# Patient Record
Sex: MALE | Race: BLACK OR AFRICAN AMERICAN | Employment: OTHER
[De-identification: names, ages, dates, MRNs, and addresses within clinical notes are randomized per-mention and may not be internally consistent; named-entity substitution may affect disease eponyms.]

---

## 2024-11-20 ENCOUNTER — HOSPITAL ENCOUNTER (EMERGENCY)
Facility: HOSPITAL | Age: 64
Discharge: HOME | End: 2024-11-20
Payer: MEDICARE

## 2024-11-20 ENCOUNTER — APPOINTMENT (OUTPATIENT)
Facility: HOSPITAL | Age: 64
End: 2024-11-20
Payer: MEDICARE

## 2024-11-20 VITALS
SYSTOLIC BLOOD PRESSURE: 116 MMHG | OXYGEN SATURATION: 100 % | DIASTOLIC BLOOD PRESSURE: 70 MMHG | HEART RATE: 67 BPM | RESPIRATION RATE: 17 BRPM | BODY MASS INDEX: 27.2 KG/M2 | TEMPERATURE: 97.8 F | WEIGHT: 190 LBS | HEIGHT: 70 IN
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
## 2024-11-20 DIAGNOSIS — M79.604 RIGHT LEG PAIN: Primary | ICD-10-CM

## 2024-11-20 PROCEDURE — 73562 X-RAY EXAM OF KNEE 3: CPT

## 2024-11-20 PROCEDURE — 99283 EMERGENCY DEPT VISIT LOW MDM: CPT

## 2024-11-20 PROCEDURE — 73590 X-RAY EXAM OF LOWER LEG: CPT

## 2024-11-20 ASSESSMENT — PAIN SCALES - GENERAL: PAINLEVEL_OUTOF10: 2

## 2024-11-20 ASSESSMENT — LIFESTYLE VARIABLES
HOW MANY STANDARD DRINKS CONTAINING ALCOHOL DO YOU HAVE ON A TYPICAL DAY: PATIENT DOES NOT DRINK
HOW OFTEN DO YOU HAVE A DRINK CONTAINING ALCOHOL: NEVER

## 2024-11-20 ASSESSMENT — PAIN - FUNCTIONAL ASSESSMENT: PAIN_FUNCTIONAL_ASSESSMENT: 0-10

## 2024-11-20 ASSESSMENT — PAIN DESCRIPTION - DESCRIPTORS: DESCRIPTORS: PRESSURE

## 2024-11-20 ASSESSMENT — PAIN DESCRIPTION - ORIENTATION: ORIENTATION: RIGHT;LOWER

## 2024-11-20 ASSESSMENT — PAIN DESCRIPTION - LOCATION: LOCATION: LEG

## 2024-11-20 NOTE — DISCHARGE INSTRUCTIONS
Thank you for choosing our Emergency Department for your care.  It is our privilege to care for you in your time of need.  In the next several days, you may receive a survey via email or mailed to your home about your experience with our team.  We would greatly appreciate you taking a few minutes to complete the survey, as we use this information to learn what we have done well and what we could be doing better. Thank you for trusting us with your care!    Below you will find a list of your tests from today's visit.   Labs  No results found for this or any previous visit (from the past 12 hour(s)).    Radiologic Studies  XR TIBIA FIBULA RIGHT (2 VIEWS)   Final Result   No acute fracture. Osteoarthritis of the knee and nonspecific, nonaggressive   proximal tibial sclerosis         Electronically signed by Jj Adam      XR KNEE RIGHT (3 VIEWS)   Final Result   No acute fracture. Osteoarthritis of the knee and nonspecific, nonaggressive   proximal tibial sclerosis         Electronically signed by Jj Adam        ------------------------------------------------------------------------------------------------------------  The evaluation and treatment you received in the Emergency Department were for an urgent problem. It is important that you follow-up with a doctor, nurse practitioner, or physician assistant to:  (1) confirm your diagnosis,  (2) re-evaluation of changes in your illness and treatment, and (3) for ongoing care. Please take your discharge instructions with you when you go to your follow-up appointment.     If you have any problem arranging a follow-up appointment, contact us!  If your symptoms become worse or you do not improve as expected, please return to us. We are available 24 hours a day.     If a prescription has been provided, please fill it as soon as possible to prevent a delay in treatment. If you have any questions or reservations about taking the medication due to side effects or

## 2024-11-20 NOTE — ED PROVIDER NOTES
[facility] EMERGENCY DEPT  EMERGENCY DEPARTMENT HISTORY AND PHYSICAL EXAM      Date: 11/20/2024  Patient Name: Roger Hernandez Jr.  MRN: [id number]  YOB: 1960  Date of evaluation: 11/20/2024  Provider: Marcella Bertrand PA-C   Note Started: 1:26 PM EST 11/20/24    HISTORY OF PRESENT ILLNESS     Chief Complaint   Patient presents with    Leg Pain    Leg Injury       History Provided By: Patient    HPI: Roger Hernandez Jr. is a 64 y.o. male who presents ambulatory to the ED with his wife for evaluation of his right lower extremity after he slipped on a bath mat, hitting his right lower leg on the side of the tub.  Patient reports that since this injury, he has had bruising and swelling to his anterior right shin as well as discomfort in his right calf.  He also states that he is having some discomfort in his knee when bearing weight and moving.  He was not experiencing any calf discomfort prior to the fall.  No history of PE or DVT. No history of anticoagulation.  He did not hit his head during the fall and denies any other injuries from the fall.  No dizziness, weakness, chest pain, or difficulty breathing prior to or after the fall.  No amnesic events.     PAST MEDICAL HISTORY   Past Medical History:  History reviewed. No pertinent past medical history.    Past Surgical History:  History reviewed. No pertinent surgical history.    Family History:  History reviewed. No pertinent family history.    Social History:  Social History     Tobacco Use    Smoking status: Never    Smokeless tobacco: Never   Substance Use Topics    Alcohol use: Not Currently    Drug use: Never       Allergies:  Allergies   Allergen Reactions    Bee Venom Anaphylaxis       PCP: Andrey Burgess MD    Current Meds:   No current facility-administered medications for this encounter.     No current outpatient medications on file.       Social Determinants of Health:   Social Determinants of Health     Tobacco Use: Low Risk  (11/20/2024)

## 2024-11-20 NOTE — ED TRIAGE NOTES
Pt fell this morning, slipped on the shower mat in the tub while getting into the shower and right shin hit the outside of the tub, pt complaining of anterior lower leg swelling and pressure in his right calf.